# Patient Record
Sex: MALE | Race: WHITE
[De-identification: names, ages, dates, MRNs, and addresses within clinical notes are randomized per-mention and may not be internally consistent; named-entity substitution may affect disease eponyms.]

---

## 2019-03-06 PROBLEM — Z00.00 ENCOUNTER FOR PREVENTIVE HEALTH EXAMINATION: Status: ACTIVE | Noted: 2019-03-06

## 2019-03-07 ENCOUNTER — APPOINTMENT (OUTPATIENT)
Dept: ORTHOPEDIC SURGERY | Facility: CLINIC | Age: 44
End: 2019-03-07
Payer: COMMERCIAL

## 2019-03-07 VITALS
HEART RATE: 63 BPM | HEIGHT: 70 IN | BODY MASS INDEX: 25.05 KG/M2 | WEIGHT: 175 LBS | SYSTOLIC BLOOD PRESSURE: 129 MMHG | DIASTOLIC BLOOD PRESSURE: 75 MMHG

## 2019-03-07 DIAGNOSIS — Z78.9 OTHER SPECIFIED HEALTH STATUS: ICD-10-CM

## 2019-03-07 DIAGNOSIS — M54.5 LOW BACK PAIN: ICD-10-CM

## 2019-03-07 DIAGNOSIS — Z87.39 PERSONAL HISTORY OF OTHER DISEASES OF THE MUSCULOSKELETAL SYSTEM AND CONNECTIVE TISSUE: ICD-10-CM

## 2019-03-07 PROCEDURE — 99203 OFFICE O/P NEW LOW 30 MIN: CPT

## 2019-03-07 RX ORDER — METHOCARBAMOL 500 MG/1
500 TABLET, FILM COATED ORAL 4 TIMES DAILY
Qty: 50 | Refills: 0 | Status: ACTIVE | COMMUNITY
Start: 2019-03-07 | End: 1900-01-01

## 2019-03-07 RX ORDER — DICLOFENAC SODIUM 75 MG/1
75 TABLET, DELAYED RELEASE ORAL
Qty: 1 | Refills: 0 | Status: ACTIVE | COMMUNITY
Start: 2019-03-07 | End: 1900-01-01

## 2019-03-07 NOTE — PHYSICAL EXAM
[UE/LE] : Sensory: Intact in bilateral upper & lower extremities [ALL] : dorsalis pedis, posterior tibial, femoral, popliteal, and radial 2+ and symmetric bilaterally [Poor Appearance] : well-appearing [Acute Distress] : not in acute distress [Normal] : Oriented to person, place, and time, insight and judgement were intact and the affect was normal [de-identified] : Gait Steady \par \par Spine \par bilateral lumbar paraspinal muscle tenderness No bony tenderness, no step-offs, \par \par \par Lumbar ROM\par Flexion 60 degrees\par Extension 25 degrees\par Lateral bend 25 degrees \par \par Lower Extremities Reflexes 2 +, \par Strength \par Hip flexion 5/5\par Knee Extension 5/5\par Dorsi/Plantar flexion 5/5\par EHL 5/5\par Clonus Negative \par Babinski Negative \par SLR - Negative \par Sensation intact and equal to light touch bilaterally\par Distal Pulses intact\par \par

## 2019-03-07 NOTE — HISTORY OF PRESENT ILLNESS
[de-identified] : Patient is a 43-year-old male presents complaining of low back pain for the past 5 days. Patient states no incident that started the pain. Patient locates pain to the lower bilateral lumbar region. Patient denies any radiation of pain no weakness or bladder dysfunction no saddle anesthesia. Patient states he has had back issues in the past that flares up intermittently. Patient states he has had therapy in the past and works out daily. [Pain Location] : pain

## 2019-03-07 NOTE — DISCUSSION/SUMMARY
[de-identified] : Discussion had with patient \par Patient will follow up with PMR\par Patient aware must follow up with MD for further eval and treatment \par Patient will start Physical Therapy \par Patients questions were answered and patient is satisfied with today's visit\par

## 2022-04-02 ENCOUNTER — TRANSCRIPTION ENCOUNTER (OUTPATIENT)
Age: 47
End: 2022-04-02

## 2025-05-14 ENCOUNTER — NON-APPOINTMENT (OUTPATIENT)
Age: 50
End: 2025-05-14